# Patient Record
Sex: MALE | Race: WHITE | Employment: FULL TIME | URBAN - METROPOLITAN AREA
[De-identification: names, ages, dates, MRNs, and addresses within clinical notes are randomized per-mention and may not be internally consistent; named-entity substitution may affect disease eponyms.]

---

## 2017-01-25 ENCOUNTER — HOSPITAL ENCOUNTER (OUTPATIENT)
Age: 57
Discharge: HOME | End: 2017-01-25
Attending: EMERGENCY MEDICINE
Payer: COMMERCIAL

## 2017-01-25 VITALS
RESPIRATION RATE: 16 BRPM | WEIGHT: 315 LBS | BODY MASS INDEX: 40.43 KG/M2 | HEIGHT: 74 IN | HEART RATE: 78 BPM | OXYGEN SATURATION: 96 % | SYSTOLIC BLOOD PRESSURE: 133 MMHG | DIASTOLIC BLOOD PRESSURE: 91 MMHG | TEMPERATURE: 98 F

## 2017-01-25 DIAGNOSIS — J06.9 VIRAL URI: Primary | ICD-10-CM

## 2017-01-25 PROCEDURE — 99213 OFFICE O/P EST LOW 20 MIN: CPT

## 2017-01-25 PROCEDURE — 99212 OFFICE O/P EST SF 10 MIN: CPT

## 2017-01-25 RX ORDER — FLUTICASONE PROPIONATE 50 MCG
2 SPRAY, SUSPENSION (ML) NASAL DAILY
Qty: 16 G | Refills: 0 | Status: SHIPPED | OUTPATIENT
Start: 2017-01-25 | End: 2017-06-28

## 2017-01-25 RX ORDER — LORATADINE 10 MG/1
10 TABLET ORAL DAILY
Qty: 30 TABLET | Refills: 0 | Status: SHIPPED | OUTPATIENT
Start: 2017-01-25 | End: 2017-02-24

## 2017-01-25 RX ORDER — FLUTICASONE PROPIONATE 50 MCG
2 SPRAY, SUSPENSION (ML) NASAL DAILY
Qty: 16 G | Refills: 0 | Status: SHIPPED | OUTPATIENT
Start: 2017-01-25 | End: 2017-01-25

## 2017-01-25 RX ORDER — LORATADINE 10 MG/1
10 TABLET ORAL DAILY
Qty: 30 TABLET | Refills: 0 | Status: SHIPPED | OUTPATIENT
Start: 2017-01-25 | End: 2017-01-25

## 2017-01-25 NOTE — ED INITIAL ASSESSMENT (HPI)
Patient c/o nasal congestion and runny nose for 3 days. Patient has been taking benadryl without relief. Patient denies cough. States he thinks he needs abx.

## 2017-01-26 NOTE — ED PROVIDER NOTES
Patient presents with:  Nasal Congestion    HPI:     Maegan Montes is a 49year old male who presents with chief complaint of nasal congestion. Started 3 days ago. No fevers, ear pain, sore throat, cough, facial pain, dental pain.   Works for the post off

## 2017-06-28 PROCEDURE — 82746 ASSAY OF FOLIC ACID SERUM: CPT | Performed by: FAMILY MEDICINE

## 2017-06-28 PROCEDURE — 82607 VITAMIN B-12: CPT | Performed by: FAMILY MEDICINE

## 2017-08-08 PROCEDURE — 36415 COLL VENOUS BLD VENIPUNCTURE: CPT | Performed by: FAMILY MEDICINE

## 2017-08-08 PROCEDURE — 82746 ASSAY OF FOLIC ACID SERUM: CPT | Performed by: FAMILY MEDICINE

## 2017-08-08 PROCEDURE — 82607 VITAMIN B-12: CPT | Performed by: FAMILY MEDICINE

## 2017-08-14 RX ORDER — MELATONIN
1000 DAILY
COMMUNITY

## 2017-08-14 RX ORDER — FOLIC ACID 1 MG/1
1 TABLET ORAL DAILY
COMMUNITY

## 2017-08-24 ENCOUNTER — HOSPITAL ENCOUNTER (OUTPATIENT)
Facility: HOSPITAL | Age: 57
Setting detail: HOSPITAL OUTPATIENT SURGERY
Discharge: HOME | End: 2017-08-24
Attending: INTERNAL MEDICINE | Admitting: INTERNAL MEDICINE
Payer: COMMERCIAL

## 2017-08-24 ENCOUNTER — SURGERY (OUTPATIENT)
Age: 57
End: 2017-08-24

## 2017-08-24 VITALS
DIASTOLIC BLOOD PRESSURE: 65 MMHG | SYSTOLIC BLOOD PRESSURE: 117 MMHG | HEART RATE: 60 BPM | OXYGEN SATURATION: 95 % | HEIGHT: 74 IN | RESPIRATION RATE: 18 BRPM | WEIGHT: 315 LBS | BODY MASS INDEX: 40.43 KG/M2 | TEMPERATURE: 98 F

## 2017-08-24 DIAGNOSIS — Z86.010 HISTORY OF COLONIC POLYPS: ICD-10-CM

## 2017-08-24 DIAGNOSIS — Z86.010 PERSONAL HISTORY OF COLONIC POLYPS: ICD-10-CM

## 2017-08-24 PROCEDURE — 0DJD8ZZ INSPECTION OF LOWER INTESTINAL TRACT, VIA NATURAL OR ARTIFICIAL OPENING ENDOSCOPIC: ICD-10-PCS | Performed by: INTERNAL MEDICINE

## 2017-08-24 RX ORDER — MIDAZOLAM HYDROCHLORIDE 1 MG/ML
INJECTION INTRAMUSCULAR; INTRAVENOUS
Status: DISCONTINUED | OUTPATIENT
Start: 2017-08-24 | End: 2017-08-24

## 2017-08-24 RX ORDER — SODIUM CHLORIDE, SODIUM LACTATE, POTASSIUM CHLORIDE, CALCIUM CHLORIDE 600; 310; 30; 20 MG/100ML; MG/100ML; MG/100ML; MG/100ML
INJECTION, SOLUTION INTRAVENOUS CONTINUOUS
Status: DISCONTINUED | OUTPATIENT
Start: 2017-08-24 | End: 2017-08-24

## 2017-08-24 NOTE — DISCHARGE SUMMARY
Outpatient Surgery Brief Discharge Summary         Patient ID:  Vida Cummings  [id number]  56year old  7/25/1960    Discharge Diagnoses: diverticulosis    Procedures: Colonoscopy.     Discharged Condition: stable    Disposition: home    Patient Instructions

## 2017-08-24 NOTE — H&P
[facility] Department of  Gastroenterology  Health History   8/24/2017    Nicole Amato  male   Gastroenterologist   [place], [place]     [id number]  7/25/1960 Primary Care Physician  Dhara Bae DO       HPI :  Nicole Amato is here for [facility] Phosphate       Nausea only     Current MEDS:    No current facility-administered medications for this encounter. No current facility-administered medications on file prior to encounter.    Current Outpatient Prescriptions on File Prior to Encounter:  l understanding and agreed to proceed with procedure as scheduled.         Anish Salazar MD    6:42 AM

## 2017-08-24 NOTE — OPERATIVE REPORT
Patient Name:  Jonny Drew   Medical Record #: [id number]       Date of Procedure: 8/24/2017    Preoperative Diagnosis: Screen for colon cancer. Postoperative Diagnosis: Diverticulosis.     Procedure Performed: Colonoscopy to the cecum and terminal ileu screening.     Marcelino Odonnell M.D.

## 2017-08-24 NOTE — BRIEF OP NOTE
Pre-Operative Diagnosis: Personal history of colonic polyps [Z86.010]     Post-Operative Diagnosis: diverticulosis     Procedure Performed:   Procedure(s):  colonoscopy    Surgeon(s) and Role:     Cherelle Pitts MD - Primary    Assistant(s):        S

## 2018-01-07 ENCOUNTER — APPOINTMENT (OUTPATIENT)
Dept: GENERAL RADIOLOGY | Age: 58
End: 2018-01-07
Attending: NURSE PRACTITIONER
Payer: COMMERCIAL

## 2018-01-07 ENCOUNTER — HOSPITAL ENCOUNTER (OUTPATIENT)
Age: 58
Discharge: HOME | End: 2018-01-07
Payer: COMMERCIAL

## 2018-01-07 VITALS
BODY MASS INDEX: 45 KG/M2 | HEART RATE: 86 BPM | RESPIRATION RATE: 16 BRPM | OXYGEN SATURATION: 97 % | DIASTOLIC BLOOD PRESSURE: 69 MMHG | TEMPERATURE: 96 F | SYSTOLIC BLOOD PRESSURE: 153 MMHG | WEIGHT: 315 LBS

## 2018-01-07 DIAGNOSIS — J20.9 ACUTE BRONCHITIS, UNSPECIFIED ORGANISM: Primary | ICD-10-CM

## 2018-01-07 PROCEDURE — 99213 OFFICE O/P EST LOW 20 MIN: CPT

## 2018-01-07 PROCEDURE — 71046 X-RAY EXAM CHEST 2 VIEWS: CPT | Performed by: NURSE PRACTITIONER

## 2018-01-07 PROCEDURE — 99214 OFFICE O/P EST MOD 30 MIN: CPT

## 2018-01-07 RX ORDER — ALBUTEROL SULFATE 90 UG/1
1 AEROSOL, METERED RESPIRATORY (INHALATION) EVERY 4 HOURS PRN
Qty: 1 INHALER | Refills: 0 | Status: SHIPPED | OUTPATIENT
Start: 2018-01-07 | End: 2019-01-08

## 2018-01-07 RX ORDER — PREDNISONE 20 MG/1
40 TABLET ORAL DAILY
Qty: 10 TABLET | Refills: 0 | Status: SHIPPED | OUTPATIENT
Start: 2018-01-07 | End: 2018-01-12

## 2018-01-07 NOTE — ED INITIAL ASSESSMENT (HPI)
Pt sts cough/cold symptoms began 3-4 days days. Pain to chest/ribs when coughing. Cough is dry, chest feels tight. ? Fever.

## 2018-01-07 NOTE — ED PROVIDER NOTES
Patient Seen in: [address] [facility]    History   Patient presents with:  Cough/URI    Stated Complaint: Cough,Congestion    56-year-old male presents today with complaints of productive cough for last 2 or 3 days.   States has intermittent sh Smoker                                                    Packs/day: 0.00      Years: 0.00         Types: Cigars  Smokeless tobacco: Never Used                      Comment: 5 CIGARS PER YEAR  Alcohol use:  Yes              Comment: 1-2 TIMES PER MONTH congestion or shortness of breath. Follow with primary MD in 5-7 days symptoms not improved. Go to ER with any labored breathing uncontrolled wheezing or worsening of symptoms.           Disposition and Plan     Clinical Impression:  Acute bronchitis, uns

## 2018-03-28 PROBLEM — M72.2 PLANTAR FASCIITIS: Status: ACTIVE | Noted: 2018-03-28

## 2018-07-25 ENCOUNTER — HOSPITAL ENCOUNTER (OUTPATIENT)
Age: 58
Discharge: HOME | End: 2018-07-25
Attending: FAMILY MEDICINE
Payer: COMMERCIAL

## 2018-07-25 ENCOUNTER — APPOINTMENT (OUTPATIENT)
Dept: CT IMAGING | Age: 58
End: 2018-07-25
Attending: FAMILY MEDICINE
Payer: COMMERCIAL

## 2018-07-25 VITALS
SYSTOLIC BLOOD PRESSURE: 153 MMHG | OXYGEN SATURATION: 97 % | WEIGHT: 315 LBS | RESPIRATION RATE: 16 BRPM | DIASTOLIC BLOOD PRESSURE: 90 MMHG | TEMPERATURE: 98 F | BODY MASS INDEX: 45 KG/M2 | HEART RATE: 77 BPM

## 2018-07-25 DIAGNOSIS — K29.00 ACUTE GASTRITIS WITHOUT HEMORRHAGE, UNSPECIFIED GASTRITIS TYPE: Primary | ICD-10-CM

## 2018-07-25 LAB
#LYMPHOCYTE IC: 2.7 X10ˆ3/UL (ref 0.9–3.2)
#MXD IC: 0.6 X10ˆ3/UL (ref 0.1–1)
#NEUTROPHIL IC: 5.8 X10ˆ3/UL (ref 1.3–6.7)
ALBUMIN SERPL-MCNC: 4.1 G/DL (ref 3.5–4.8)
ALBUMIN/GLOB SERPL: 1.1 {RATIO} (ref 1–2)
ALP LIVER SERPL-CCNC: 70 U/L (ref 45–117)
ALT SERPL-CCNC: 37 U/L (ref 17–63)
ANION GAP SERPL CALC-SCNC: 7 MMOL/L (ref 0–18)
AST SERPL-CCNC: 24 U/L (ref 15–41)
BILIRUB SERPL-MCNC: 0.4 MG/DL (ref 0.1–2)
BUN BLD-MCNC: 18 MG/DL (ref 8–20)
BUN/CREAT SERPL: 18.4 (ref 10–20)
CALCIUM BLD-MCNC: 9.5 MG/DL (ref 8.3–10.3)
CHLORIDE SERPL-SCNC: 105 MMOL/L (ref 101–111)
CO2 SERPL-SCNC: 28 MMOL/L (ref 22–32)
CREAT BLD-MCNC: 0.98 MG/DL (ref 0.7–1.3)
CREAT SERPL-MCNC: 0.9 MG/DL (ref 0.7–1.3)
GLOBULIN PLAS-MCNC: 3.8 G/DL (ref 2.5–3.7)
GLUCOSE BLD-MCNC: 108 MG/DL (ref 70–99)
GLUCOSE BLD-MCNC: 112 MG/DL (ref 70–99)
HCT IC: 49.1 % (ref 37–54)
HGB IC: 16.6 G/DL (ref 13–17)
ISTAT BLOOD GAS TCO2: 26 MMOL/L (ref 22–32)
ISTAT BUN: 19 MG/DL (ref 8–20)
ISTAT CHLORIDE: 103 MMOL/L (ref 101–111)
ISTAT HEMATOCRIT: 51 % (ref 37–53)
ISTAT IONIZED CALCIUM: 1.17 MMOL/L
ISTAT POTASSIUM: 4.2 MMOL/L (ref 3.6–5.1)
ISTAT SODIUM: 140 MMOL/L (ref 136–144)
LIPASE: 118 U/L (ref 73–393)
LYMPHOCYTES NFR BLD AUTO: 29.4 %
M PROTEIN MFR SERPL ELPH: 7.9 G/DL (ref 6.1–8.3)
MCH IC: 29.9 PG (ref 27–33.2)
MCHC IC: 33.8 G/DL (ref 31–37)
MCV IC: 88.3 FL (ref 80–99)
MIXED CELL %: 6.3 %
NEUTROPHILS NFR BLD AUTO: 64.3 %
OSMOLALITY SERPL CALC.SUM OF ELEC: 292 MOSM/KG (ref 275–295)
PLT IC: 272 X10ˆ3/UL (ref 150–450)
POCT BILIRUBIN URINE: NEGATIVE
POCT BLOOD URINE: NEGATIVE
POCT GLUCOSE URINE: NEGATIVE MG/DL
POCT KETONE URINE: NEGATIVE MG/DL
POCT LEUKOCYTE ESTERASE URINE: NEGATIVE
POCT NITRITE URINE: NEGATIVE
POCT PH URINE: 7 (ref 5–8)
POCT PROTEIN URINE: NEGATIVE MG/DL
POCT SPECIFIC GRAVITY URINE: 1.02
POCT URINE CLARITY: CLEAR
POCT URINE COLOR: YELLOW
POCT UROBILINOGEN URINE: 0.2 MG/DL
POTASSIUM SERPL-SCNC: 4.3 MMOL/L (ref 3.6–5.1)
RBC IC: 5.56 X10ˆ6/UL (ref 4.3–5.7)
SODIUM SERPL-SCNC: 140 MMOL/L (ref 136–144)
WBC IC: 9.1 X10ˆ3/UL (ref 4–13)

## 2018-07-25 PROCEDURE — 83690 ASSAY OF LIPASE: CPT | Performed by: FAMILY MEDICINE

## 2018-07-25 PROCEDURE — 36415 COLL VENOUS BLD VENIPUNCTURE: CPT

## 2018-07-25 PROCEDURE — 99215 OFFICE O/P EST HI 40 MIN: CPT

## 2018-07-25 PROCEDURE — 80047 BASIC METABLC PNL IONIZED CA: CPT

## 2018-07-25 PROCEDURE — 80053 COMPREHEN METABOLIC PANEL: CPT | Performed by: FAMILY MEDICINE

## 2018-07-25 PROCEDURE — 81002 URINALYSIS NONAUTO W/O SCOPE: CPT | Performed by: FAMILY MEDICINE

## 2018-07-25 PROCEDURE — 85025 COMPLETE CBC W/AUTO DIFF WBC: CPT | Performed by: FAMILY MEDICINE

## 2018-07-25 PROCEDURE — 74177 CT ABD & PELVIS W/CONTRAST: CPT | Performed by: FAMILY MEDICINE

## 2018-07-25 PROCEDURE — 93010 ELECTROCARDIOGRAM REPORT: CPT

## 2018-07-25 PROCEDURE — 93005 ELECTROCARDIOGRAM TRACING: CPT

## 2018-07-25 NOTE — ED INITIAL ASSESSMENT (HPI)
Upper abd pain for one day, pt had severe acid refux last night, and he took his Nexium and tums with no reFlux. Pain comes and goes per patient, stool is green, when he has abd pain he is complaining of nausesa, denies now, no vomiting or diarrhea.

## 2018-07-26 NOTE — PROGRESS NOTES
Patient was seen in [facility] on 7/25/18. Diagnosed with Gastritis. Patient was not prescribed any medications.

## 2018-07-27 NOTE — ED PROVIDER NOTES
Patient Seen in: [address] [facility]    History   Patient presents with:  Abdominal Pain    Stated Complaint: STOMACH PAIN/ REFLUX PAIN     HPI    66year old male presents for upper abdominal pain and nausea.  Patient states he has history of Systems    Positive for stated complaint: STOMACH PAIN/ REFLUX PAIN   Other systems are as noted in HPI. Constitutional and vital signs reviewed. All other systems reviewed and negative except as noted above.     Physical Exam   ED Triage Vitals [07/2 0988  ------------------------------------------------------------  MDM     Patient presents for acute epigastric pain. EKG was normal. Cbc and BMP was normal. cmp and Lipase was ordered. CT abdomen/pelvis with contrast was unremarkable.  Advised to take ma

## 2018-07-30 LAB
ATRIAL RATE: 81 BPM
P AXIS: 28 DEGREES
P-R INTERVAL: 144 MS
Q-T INTERVAL: 362 MS
QRS DURATION: 100 MS
QTC CALCULATION (BEZET): 420 MS
R AXIS: 42 DEGREES
T AXIS: 18 DEGREES
VENTRICULAR RATE: 81 BPM

## 2019-08-28 PROBLEM — M17.11 PRIMARY OSTEOARTHRITIS OF RIGHT KNEE: Status: ACTIVE | Noted: 2019-08-28

## 2019-09-13 ENCOUNTER — HOSPITAL ENCOUNTER (OUTPATIENT)
Age: 59
Discharge: HOME | End: 2019-09-13
Attending: FAMILY MEDICINE
Payer: COMMERCIAL

## 2019-09-13 VITALS
DIASTOLIC BLOOD PRESSURE: 86 MMHG | HEART RATE: 89 BPM | OXYGEN SATURATION: 96 % | RESPIRATION RATE: 18 BRPM | TEMPERATURE: 99 F | SYSTOLIC BLOOD PRESSURE: 156 MMHG

## 2019-09-13 DIAGNOSIS — H60.02: Primary | ICD-10-CM

## 2019-09-13 PROCEDURE — 99213 OFFICE O/P EST LOW 20 MIN: CPT

## 2019-09-13 PROCEDURE — 99214 OFFICE O/P EST MOD 30 MIN: CPT

## 2019-09-13 RX ORDER — CEPHALEXIN 500 MG/1
500 CAPSULE ORAL 3 TIMES DAILY
Qty: 30 CAPSULE | Refills: 0 | Status: SHIPPED | OUTPATIENT
Start: 2019-09-13 | End: 2019-09-23

## 2019-09-13 NOTE — ED PROVIDER NOTES
Patient Seen in: [address] [facility]    History   Patient presents with:  Ear Problem Pain (neurosensory)    Stated Complaint: ear pain    HPI    62-year-old male presents to the immediate care today with chief complaints of pain in his left stated complaint: ear pain  Other systems are as noted in HPI. Constitutional and vital signs reviewed. All other systems reviewed and negative except as noted above.     Physical Exam     ED Triage Vitals [09/13/19 1716]   /86   Pulse 89   Resp [id number]  [phone]    In 1 week  For re-check, If symptoms worsen or do not improve        Medications Prescribed:  Current Discharge Medication List    START taking these medications    cephALEXin (KEFLEX) 500 MG Oral Cap  Take 1 capsule (500 mg total) b

## 2019-09-13 NOTE — ED INITIAL ASSESSMENT (HPI)
Left ear pain for one and half weeks, tried sudafed and over the counter ear drops for swimmer's ear, no fevers, but now has pain in left ear, 7/10

## 2020-02-06 ENCOUNTER — HOSPITAL ENCOUNTER (EMERGENCY)
Facility: HOSPITAL | Age: 60
Discharge: HOME | End: 2020-02-06
Attending: EMERGENCY MEDICINE
Payer: COMMERCIAL

## 2020-02-06 VITALS
RESPIRATION RATE: 18 BRPM | BODY MASS INDEX: 40.43 KG/M2 | DIASTOLIC BLOOD PRESSURE: 77 MMHG | HEIGHT: 74 IN | WEIGHT: 315 LBS | OXYGEN SATURATION: 95 % | SYSTOLIC BLOOD PRESSURE: 157 MMHG | TEMPERATURE: 98 F | HEART RATE: 86 BPM

## 2020-02-06 DIAGNOSIS — E86.0 DEHYDRATION: ICD-10-CM

## 2020-02-06 DIAGNOSIS — K52.9 GASTROENTERITIS: Primary | ICD-10-CM

## 2020-02-06 LAB
ALBUMIN SERPL-MCNC: 4.2 G/DL (ref 3.4–5)
ALBUMIN/GLOB SERPL: 1 {RATIO} (ref 1–2)
ALP LIVER SERPL-CCNC: 67 U/L (ref 45–117)
ALT SERPL-CCNC: 36 U/L (ref 16–61)
ANION GAP SERPL CALC-SCNC: 9 MMOL/L (ref 0–18)
AST SERPL-CCNC: 24 U/L (ref 15–37)
BASOPHILS # BLD AUTO: 0.05 X10(3) UL (ref 0–0.2)
BASOPHILS NFR BLD AUTO: 0.3 %
BILIRUB SERPL-MCNC: 0.9 MG/DL (ref 0.1–2)
BILIRUB UR QL STRIP.AUTO: NEGATIVE
BUN BLD-MCNC: 31 MG/DL (ref 7–18)
BUN/CREAT SERPL: 29.5 (ref 10–20)
CALCIUM BLD-MCNC: 9.3 MG/DL (ref 8.5–10.1)
CHLORIDE SERPL-SCNC: 103 MMOL/L (ref 98–112)
CO2 SERPL-SCNC: 22 MMOL/L (ref 21–32)
COLOR UR AUTO: YELLOW
CREAT BLD-MCNC: 1.05 MG/DL (ref 0.7–1.3)
DEPRECATED RDW RBC AUTO: 41.9 FL (ref 35.1–46.3)
EOSINOPHIL # BLD AUTO: 0 X10(3) UL (ref 0–0.7)
EOSINOPHIL NFR BLD AUTO: 0 %
ERYTHROCYTE [DISTWIDTH] IN BLOOD BY AUTOMATED COUNT: 13.4 % (ref 11–15)
GLOBULIN PLAS-MCNC: 4.2 G/DL (ref 2.8–4.4)
GLUCOSE BLD-MCNC: 143 MG/DL (ref 70–99)
GLUCOSE UR STRIP.AUTO-MCNC: NEGATIVE MG/DL
HCT VFR BLD AUTO: 54.5 % (ref 39–53)
HGB BLD-MCNC: 18.4 G/DL (ref 13–17.5)
IMM GRANULOCYTES # BLD AUTO: 0.05 X10(3) UL (ref 0–1)
IMM GRANULOCYTES NFR BLD: 0.3 %
KETONES UR STRIP.AUTO-MCNC: NEGATIVE MG/DL
LEUKOCYTE ESTERASE UR QL STRIP.AUTO: NEGATIVE
LYMPHOCYTES # BLD AUTO: 0.36 X10(3) UL (ref 1–4)
LYMPHOCYTES NFR BLD AUTO: 2.5 %
M PROTEIN MFR SERPL ELPH: 8.4 G/DL (ref 6.4–8.2)
MCH RBC QN AUTO: 29.7 PG (ref 26–34)
MCHC RBC AUTO-ENTMCNC: 33.8 G/DL (ref 31–37)
MCV RBC AUTO: 87.9 FL (ref 80–100)
MONOCYTES # BLD AUTO: 0.36 X10(3) UL (ref 0.1–1)
MONOCYTES NFR BLD AUTO: 2.5 %
NEUTROPHILS # BLD AUTO: 13.87 X10 (3) UL (ref 1.5–7.7)
NEUTROPHILS # BLD AUTO: 13.87 X10(3) UL (ref 1.5–7.7)
NEUTROPHILS NFR BLD AUTO: 94.4 %
NITRITE UR QL STRIP.AUTO: NEGATIVE
OSMOLALITY SERPL CALC.SUM OF ELEC: 287 MOSM/KG (ref 275–295)
PH UR STRIP.AUTO: 5 [PH] (ref 4.5–8)
PLATELET # BLD AUTO: 269 10(3)UL (ref 150–450)
POTASSIUM SERPL-SCNC: 4 MMOL/L (ref 3.5–5.1)
PROT UR STRIP.AUTO-MCNC: 30 MG/DL
RBC # BLD AUTO: 6.2 X10(6)UL (ref 4.3–5.7)
RBC UR QL AUTO: NEGATIVE
SODIUM SERPL-SCNC: 134 MMOL/L (ref 136–145)
SP GR UR STRIP.AUTO: 1.03 (ref 1–1.03)
UROBILINOGEN UR STRIP.AUTO-MCNC: <2 MG/DL
WBC # BLD AUTO: 14.7 X10(3) UL (ref 4–11)

## 2020-02-06 PROCEDURE — 85025 COMPLETE CBC W/AUTO DIFF WBC: CPT | Performed by: EMERGENCY MEDICINE

## 2020-02-06 PROCEDURE — 99284 EMERGENCY DEPT VISIT MOD MDM: CPT

## 2020-02-06 PROCEDURE — 96374 THER/PROPH/DIAG INJ IV PUSH: CPT

## 2020-02-06 PROCEDURE — 81001 URINALYSIS AUTO W/SCOPE: CPT | Performed by: EMERGENCY MEDICINE

## 2020-02-06 PROCEDURE — 96361 HYDRATE IV INFUSION ADD-ON: CPT

## 2020-02-06 PROCEDURE — 96375 TX/PRO/DX INJ NEW DRUG ADDON: CPT

## 2020-02-06 PROCEDURE — 80053 COMPREHEN METABOLIC PANEL: CPT | Performed by: EMERGENCY MEDICINE

## 2020-02-06 RX ORDER — KETOROLAC TROMETHAMINE 30 MG/ML
15 INJECTION, SOLUTION INTRAMUSCULAR; INTRAVENOUS ONCE
Status: COMPLETED | OUTPATIENT
Start: 2020-02-06 | End: 2020-02-06

## 2020-02-06 RX ORDER — ONDANSETRON 4 MG/1
4 TABLET, ORALLY DISINTEGRATING ORAL EVERY 4 HOURS PRN
Qty: 10 TABLET | Refills: 0 | Status: SHIPPED | OUTPATIENT
Start: 2020-02-06 | End: 2020-02-13

## 2020-02-06 RX ORDER — ONDANSETRON 2 MG/ML
4 INJECTION INTRAMUSCULAR; INTRAVENOUS ONCE
Status: COMPLETED | OUTPATIENT
Start: 2020-02-06 | End: 2020-02-06

## 2020-02-06 NOTE — ED INITIAL ASSESSMENT (HPI)
Pt presents to ed ambulatory with steady gait c/o n/v/d that started at 1700 yesterday evening. Pt also c/o bod aches. Pt is a&ox4, moves all extremities well, resps easy.

## 2020-02-06 NOTE — ED PROVIDER NOTES
Patient Seen in: [facility] Emergency Department      History   Patient presents with:  Nausea/Vomiting/Diarrhea    Stated Complaint: n/v/d    HPI    27-year-old male comes in the hospital complaint of having difficulty with nausea, vomiting and odette noted above.     Physical Exam     ED Triage Vitals [02/06/20 0629]   BP (!) 125/96   Pulse 104   Resp 20   Temp 98 °F (36.7 °C)   Temp src Temporal   SpO2 95 %   O2 Device None (Room air)       Current:/78   Pulse 91   Temp 98 °F (36.7 °C) (Temporal) -----------         ------                     CBC W/ DIFFERENTIAL[[id number]]          Abnormal            Final result                 Please view results for these tests on the individual orders.    [facility]

## 2020-02-25 ENCOUNTER — LAB ENCOUNTER (OUTPATIENT)
Dept: LAB | Age: 60
End: 2020-02-25
Attending: FAMILY MEDICINE
Payer: COMMERCIAL

## 2020-02-25 DIAGNOSIS — K52.9 GASTROENTERITIS: ICD-10-CM

## 2020-02-25 PROCEDURE — 87493 C DIFF AMPLIFIED PROBE: CPT

## 2020-02-25 PROCEDURE — 87045 FECES CULTURE AEROBIC BACT: CPT

## 2020-02-25 PROCEDURE — 87329 GIARDIA AG IA: CPT

## 2020-02-25 PROCEDURE — 87046 STOOL CULTR AEROBIC BACT EA: CPT

## 2020-02-25 PROCEDURE — 87209 SMEAR COMPLEX STAIN: CPT

## 2020-02-25 PROCEDURE — 87272 CRYPTOSPORIDIUM AG IF: CPT

## 2020-02-25 PROCEDURE — 87177 OVA AND PARASITES SMEARS: CPT

## 2020-02-26 LAB
C DIFF TOX B STL QL: NEGATIVE
CRYPTOSP AG STL QL IA: NEGATIVE
G LAMBLIA AG STL QL IA: NEGATIVE

## 2020-02-29 LAB — OVA AND PARASITE, FECAL INTERPRETATION: POSITIVE

## 2020-03-03 ENCOUNTER — LAB ENCOUNTER (OUTPATIENT)
Dept: LAB | Age: 60
End: 2020-03-03
Attending: FAMILY MEDICINE
Payer: COMMERCIAL

## 2020-03-03 DIAGNOSIS — Z13.0 SCREENING FOR DISORDER OF BLOOD AND BLOOD-FORMING ORGANS: ICD-10-CM

## 2020-03-03 DIAGNOSIS — Z13.29 ENCOUNTER FOR SCREENING FOR ENDOCRINE DISORDER: ICD-10-CM

## 2020-03-03 DIAGNOSIS — Z13.220 ENCOUNTER FOR SCREENING FOR LIPID DISORDER: ICD-10-CM

## 2020-03-03 DIAGNOSIS — R73.9 HYPERGLYCEMIA: ICD-10-CM

## 2020-03-03 DIAGNOSIS — Z12.5 ENCOUNTER FOR SCREENING FOR MALIGNANT NEOPLASM OF PROSTATE: ICD-10-CM

## 2020-03-03 DIAGNOSIS — Z13.228 ENCOUNTER FOR SCREENING FOR METABOLIC DISORDER: ICD-10-CM

## 2020-03-03 LAB
ALT SERPL-CCNC: 34 U/L (ref 16–61)
CHOLEST SMN-MCNC: 172 MG/DL (ref ?–200)
DEPRECATED RDW RBC AUTO: 43.9 FL (ref 35.1–46.3)
ERYTHROCYTE [DISTWIDTH] IN BLOOD BY AUTOMATED COUNT: 13.1 % (ref 11–15)
EST. AVERAGE GLUCOSE BLD GHB EST-MCNC: 128 MG/DL (ref 68–126)
HBA1C MFR BLD HPLC: 6.1 % (ref ?–5.7)
HCT VFR BLD AUTO: 50.9 % (ref 39–53)
HDLC SERPL-MCNC: 41 MG/DL (ref 40–59)
HGB BLD-MCNC: 16.6 G/DL (ref 13–17.5)
LDLC SERPL CALC-MCNC: 108 MG/DL (ref ?–100)
MCH RBC QN AUTO: 29.7 PG (ref 26–34)
MCHC RBC AUTO-ENTMCNC: 32.6 G/DL (ref 31–37)
MCV RBC AUTO: 91.2 FL (ref 80–100)
NONHDLC SERPL-MCNC: 131 MG/DL (ref ?–130)
PATIENT FASTING Y/N/NP: YES
PLATELET # BLD AUTO: 270 10(3)UL (ref 150–450)
PSA SERPL-MCNC: 0.84 NG/ML (ref ?–4)
RBC # BLD AUTO: 5.58 X10(6)UL (ref 4.3–5.7)
TRIGL SERPL-MCNC: 113 MG/DL (ref 30–149)
TSI SER-ACNC: 3.52 MIU/ML (ref 0.36–3.74)
VLDLC SERPL CALC-MCNC: 23 MG/DL (ref 0–30)
WBC # BLD AUTO: 8.2 X10(3) UL (ref 4–11)

## 2020-03-03 PROCEDURE — 84460 ALANINE AMINO (ALT) (SGPT): CPT

## 2020-03-03 PROCEDURE — 83036 HEMOGLOBIN GLYCOSYLATED A1C: CPT

## 2020-03-03 PROCEDURE — 85027 COMPLETE CBC AUTOMATED: CPT

## 2020-03-03 PROCEDURE — 84153 ASSAY OF PSA TOTAL: CPT

## 2020-03-03 PROCEDURE — 84443 ASSAY THYROID STIM HORMONE: CPT

## 2020-03-03 PROCEDURE — 80061 LIPID PANEL: CPT

## 2020-03-03 NOTE — PROGRESS NOTES
Sugars in the prediabetes range, recommend low sugar and low carb diet   Cholesterol very mildly elevated. REcommend low fat diet.     Other labs normal

## 2020-03-03 NOTE — PROGRESS NOTES
+blastocysitis found since symptomatic will treat. Start flagyl 500mg TID x 10 days.    Avoid all alcohol with this medication

## 2020-04-29 PROBLEM — M17.0 PRIMARY OSTEOARTHRITIS OF BOTH KNEES: Status: ACTIVE | Noted: 2020-04-29

## 2020-06-10 ENCOUNTER — APPOINTMENT (OUTPATIENT)
Dept: CT IMAGING | Age: 60
End: 2020-06-10
Attending: FAMILY MEDICINE
Payer: COMMERCIAL

## 2020-06-10 ENCOUNTER — HOSPITAL ENCOUNTER (OUTPATIENT)
Age: 60
Discharge: HOME | End: 2020-06-10
Attending: FAMILY MEDICINE
Payer: COMMERCIAL

## 2020-06-10 VITALS
RESPIRATION RATE: 16 BRPM | HEART RATE: 88 BPM | SYSTOLIC BLOOD PRESSURE: 137 MMHG | OXYGEN SATURATION: 96 % | TEMPERATURE: 98 F | DIASTOLIC BLOOD PRESSURE: 84 MMHG

## 2020-06-10 DIAGNOSIS — M54.2 ANTERIOR NECK PAIN: Primary | ICD-10-CM

## 2020-06-10 PROCEDURE — 87430 STREP A AG IA: CPT | Performed by: FAMILY MEDICINE

## 2020-06-10 PROCEDURE — 80047 BASIC METABLC PNL IONIZED CA: CPT

## 2020-06-10 PROCEDURE — 70491 CT SOFT TISSUE NECK W/DYE: CPT | Performed by: FAMILY MEDICINE

## 2020-06-10 PROCEDURE — 84439 ASSAY OF FREE THYROXINE: CPT | Performed by: FAMILY MEDICINE

## 2020-06-10 PROCEDURE — 86376 MICROSOMAL ANTIBODY EACH: CPT | Performed by: FAMILY MEDICINE

## 2020-06-10 PROCEDURE — 87081 CULTURE SCREEN ONLY: CPT | Performed by: FAMILY MEDICINE

## 2020-06-10 PROCEDURE — 99214 OFFICE O/P EST MOD 30 MIN: CPT

## 2020-06-10 PROCEDURE — 36415 COLL VENOUS BLD VENIPUNCTURE: CPT

## 2020-06-10 PROCEDURE — 85025 COMPLETE CBC W/AUTO DIFF WBC: CPT | Performed by: FAMILY MEDICINE

## 2020-06-10 PROCEDURE — 84443 ASSAY THYROID STIM HORMONE: CPT | Performed by: FAMILY MEDICINE

## 2020-06-10 NOTE — ED PROVIDER NOTES
Patient Seen in: [address] [facility]      History   Patient presents with:  Sore Throat    Stated Complaint: sore throat and neck pain    HPI    *62-year-old male presents to the immediate care today with chief complaints of pain across the a patient/caregiver and is not pertinent to presenting problem.     Social History    Tobacco Use      Smoking status: Former Smoker        Types: Cigars      Smokeless tobacco: Never Used      Tobacco comment: 5 CIGARS PER YEAR    Alcohol use: Yes      Frequ C-spine tenderness. No paracervical muscle spasm.         ED Course     Labs Reviewed   POCT ISTAT CHEM8 CARTRIDGE - Abnormal; Notable for the following components:       Result Value    ISTAT BUN 23 (*)     ISTAT Ionized Calcium 1.11 (*)     ISTAT Glucose

## 2020-06-10 NOTE — ED INITIAL ASSESSMENT (HPI)
Pt sts 2 nights ago began with pain to throat/neck. Sts no pain when swallowing or talking but pain to neck when taking in a deep breath. Denies fever, cough, nasal congestion, chills, loss of smell/taste, nausea.

## 2020-06-11 NOTE — ED NOTES
Pt informed of TPO, TSH/Free t4 result. Advised to f/u with PMD for further guidance. Verb understanding. Awaiting Covid19 and strep culture results. Sts neck pain has resolved.    Result Notes for THYROID PEROXIDASE (TPO) AB     Notes recorded by Maliha Haas

## 2020-12-23 ENCOUNTER — LAB ENCOUNTER (OUTPATIENT)
Dept: LAB | Age: 60
End: 2020-12-23
Attending: FAMILY MEDICINE
Payer: COMMERCIAL

## 2020-12-23 DIAGNOSIS — R76.8 THYROID ANTIBODY POSITIVE: ICD-10-CM

## 2020-12-23 DIAGNOSIS — Z71.89 EDUCATED ABOUT COVID-19 VIRUS INFECTION: Primary | ICD-10-CM

## 2020-12-23 PROCEDURE — 84439 ASSAY OF FREE THYROXINE: CPT

## 2020-12-23 PROCEDURE — 84481 FREE ASSAY (FT-3): CPT

## 2020-12-23 PROCEDURE — 86769 SARS-COV-2 COVID-19 ANTIBODY: CPT

## 2020-12-23 PROCEDURE — 36415 COLL VENOUS BLD VENIPUNCTURE: CPT

## 2020-12-23 PROCEDURE — 84443 ASSAY THYROID STIM HORMONE: CPT

## 2021-03-03 PROBLEM — G56.02 CARPAL TUNNEL SYNDROME ON LEFT: Status: ACTIVE | Noted: 2021-03-03

## 2022-01-02 ENCOUNTER — APPOINTMENT (OUTPATIENT)
Dept: GENERAL RADIOLOGY | Age: 62
End: 2022-01-02
Attending: NURSE PRACTITIONER
Payer: COMMERCIAL

## 2022-01-02 ENCOUNTER — HOSPITAL ENCOUNTER (OUTPATIENT)
Age: 62
Discharge: HOME | End: 2022-01-02
Payer: COMMERCIAL

## 2022-01-02 VITALS
TEMPERATURE: 98 F | OXYGEN SATURATION: 98 % | SYSTOLIC BLOOD PRESSURE: 157 MMHG | BODY MASS INDEX: 40.43 KG/M2 | DIASTOLIC BLOOD PRESSURE: 83 MMHG | RESPIRATION RATE: 20 BRPM | WEIGHT: 315 LBS | HEIGHT: 74 IN | HEART RATE: 90 BPM

## 2022-01-02 DIAGNOSIS — S49.91XA INJURY OF RIGHT SHOULDER, INITIAL ENCOUNTER: Primary | ICD-10-CM

## 2022-01-02 PROCEDURE — 73030 X-RAY EXAM OF SHOULDER: CPT | Performed by: NURSE PRACTITIONER

## 2022-01-02 PROCEDURE — 99213 OFFICE O/P EST LOW 20 MIN: CPT | Performed by: NURSE PRACTITIONER

## 2022-01-02 NOTE — ED PROVIDER NOTES
Patient Seen in: Immediate [facility]      History   Patient presents with:  Arm or Hand Injury    Stated Complaint: R. Shoulder Injury    Subjective:   29-year-old male presents today with history of fall this morning while snowblowing.   States the snow palpation over the deltoid of the right shoulder. No gross deformities or swelling. Patient is able to raise his arm above his head but with increased pain. No increased pain with internal or external rotation. Normal strength to the shoulder.   Equal g

## 2022-01-02 NOTE — ED INITIAL ASSESSMENT (HPI)
Pt was snow blowing and hit an extension cord and it threw the snowblower and him. He fell landing on the r shoulder.  Co pain

## 2024-03-14 ENCOUNTER — APPOINTMENT (OUTPATIENT)
Dept: ULTRASOUND IMAGING | Age: 64
End: 2024-03-14
Payer: COMMERCIAL

## 2024-03-14 ENCOUNTER — HOSPITAL ENCOUNTER (EMERGENCY)
Age: 64
Discharge: HOME | End: 2024-03-14
Attending: EMERGENCY MEDICINE
Payer: COMMERCIAL

## 2024-03-14 VITALS
TEMPERATURE: 98 F | DIASTOLIC BLOOD PRESSURE: 80 MMHG | BODY MASS INDEX: 38.5 KG/M2 | HEART RATE: 82 BPM | SYSTOLIC BLOOD PRESSURE: 155 MMHG | HEIGHT: 74 IN | OXYGEN SATURATION: 94 % | RESPIRATION RATE: 18 BRPM | WEIGHT: 300 LBS

## 2024-03-14 DIAGNOSIS — M79.661 RIGHT CALF PAIN: Primary | ICD-10-CM

## 2024-03-14 PROCEDURE — 99284 EMERGENCY DEPT VISIT MOD MDM: CPT

## 2024-03-14 PROCEDURE — 93971 EXTREMITY STUDY: CPT

## 2024-03-14 NOTE — ED INITIAL ASSESSMENT (HPI)
Pt had right knee replacement about 2 weeks ago.  Pt having pain in posterior calf.  MD sent in to R/O blood clot.

## 2024-03-14 NOTE — DISCHARGE INSTRUCTIONS
Continue postoperative care.  Do not place any sort of tourniquet above the knee     follow-up with your primary care doctor and orthopedist

## 2024-03-14 NOTE — ED PROVIDER NOTES
Patient Seen in: [facility] [place]      History     Chief Complaint   Patient presents with    Pain     Stated Complaint: knee pain and calf pain.    Subjective:   HPI    Patient had knee replacement about 2 weeks ago.  Patient notes that his calf was very sore and swollen.  Just to touch, it was very uncomfortable.  This seems to be getting better but he was concerned about possibly DVT and referred to the emergency department for evaluation.  Patient notes that he was applying his compression stockings but also using a wrap above the knee joint around the thigh as well.  He has been trying to keep it elevated.  No discharge from the wound.  No fever.  No red streaks up the leg.  There is some bruising scattered over the lower extremity as well    Objective:   Past Medical History:   Diagnosis Date    A-fib (HCC)     Adverse effect of anesthetic     trigger to migraine    ED (erectile dysfunction) 2/16/2011    High blood pressure     High cholesterol     HYPERLIPIDEMIA     HYPERTENSION     Migraines     OTHER DISEASES quit 1994    NARCOTIC DEPENDENCE    Visual impairment     glasses              Past Surgical History:   Procedure Laterality Date    ANGIOPLASTY (CORONARY)  5/14    no stent needed    COLONOSCOPY  11/16/12  [facility]    Screening: two polyps both >1cm in right/transverse colon (hyperplastic), sigmoid diverticulosis, int hemorrhoids; next colonoscopy in 5 years    COLONOSCOPY N/A 8/24/2017    Procedure: COLONOSCOPY;  Surgeon: Lyle Alves MD;  Location:  ENDOSCOPY    OTHER SURGICAL HISTORY      BILATERAL KNEE MENISCUS SURGERY    OTHER SURGICAL HISTORY  4/2009    left knee surgery                Social History     Socioeconomic History    Marital status:    Occupational History    Occupation:    Tobacco Use    Smoking status: Former     Types: Cigars    Smokeless tobacco: Never    Tobacco comments:     5 CIGARS PER YEAR   Vaping Use    Vaping Use:  Never used   Substance and Sexual Activity    Alcohol use: Yes     Comment: 1-2 TIMES PER MONTH     Drug use: No    Sexual activity: Yes     Partners: Female     Comment: no ed   Other Topics Concern    Blood Transfusions No    Caffeine Concern No     Comment: 1-2 cups per day    Sleep Concern Yes     Comment: intermittent sleep issues    Stress Concern No    Weight Concern Yes    Exercise No     Comment: recommend 5 x per week, aerobic activity    Self-Exams Yes              Review of Systems    Positive for stated complaint: knee pain and calf pain.  Other systems are as noted in HPI.  Constitutional and vital signs reviewed.      All other systems reviewed and negative except as noted above.    Physical Exam     ED Triage Vitals [03/14/24 1548]   /69   Pulse 82   Resp 18   Temp 97.8 °F (36.6 °C)   Temp src Temporal   SpO2 96 %   O2 Device None (Room air)       Current:/80   Pulse 82   Temp 97.8 °F (36.6 °C) (Temporal)   Resp 18   Ht 188 cm (6' 2\")   Wt 136.1 kg   SpO2 94%   BMI 38.52 kg/m²         Physical Exam  On examination, a healing knee incision is noted.  This appears benign erythematous with no discharge.  The lower leg is swollen and there is some ecchymosis scattered.  There is some mild tenderness of the calf musculature but compartments are soft no evidence for compartment syndrome.  The dorsalis pedis pulses strong.  Foot is nonswollen without pedal edema.  Sensation intact to light touch.  Strength in foot is excellent       ED Course   Labs Reviewed - No data to display                   MDM      With calf pain and recent surgery, there is concern for DVT.  Strain or discomfort related to the surgery and dependent edema also include in the differential.  Ultrasound leg indicated.  No arterial insufficiency.  No evidence of any incision cellulitis    Ultrasound leg  I personally reviewed the actual radiographs themselves and my individual interpretation shows no DV  radiologist's  formal interpretation which I have reviewed    CONCLUSION:  No DVT right lower extremity.        I reviewed the results of the ultrasound with the patient.  At this point, I believe patient may be safely discharged home.  Patient describes placing a bandage above the knee.  I would discourage this.  He may wear his KIMBERLY hose and I encouraged elevation as often as possible to level of his hip or 1 pillow above.  I recommend close follow-up with his primary care doctor and/or orthopedist.                               Medical Decision Making      Disposition and Plan     Clinical Impression:  1. Right calf pain         Disposition:  Discharge  3/14/2024  5:13 pm    Follow-up:  Edie Amato DO  [address] [place] [id number] [phone]    Follow up            Medications Prescribed:  Current Discharge Medication List

## (undated) DEVICE — Device: Brand: DEFENDO AIR/WATER/SUCTION AND BIOPSY VALVE

## (undated) DEVICE — FILTERLINE NASAL ADULT O2/CO2

## (undated) DEVICE — ENDOSCOPY PACK - LOWER: Brand: MEDLINE INDUSTRIES, INC.

## (undated) DEVICE — 1200CC GUARDIAN II: Brand: GUARDIAN

## (undated) DEVICE — 3M™ RED DOT™ MONITORING ELECTRODE WITH FOAM TAPE AND STICKY GEL, 50/BAG, 20/CASE, 72/PLT 2570: Brand: RED DOT™

## (undated) NOTE — LETTER
February 28, 2020          J[address] [place] [id number]          Dear Nati Montoya:    Negative stool cultures (no infection.)      The following are the results of your recent tests ordered by [facility].   Please review the list of test

## (undated) NOTE — LETTER
Date: 8/24/2017          Patient Name: Toña Sandoval        To Whom it may concern: This letter has been written at the patient's request. The above patient was seen at the [facility] for treatment of a medical condition.     This patient s

## (undated) NOTE — LETTER
Date & Time: 1/2/2022, 9:27 AM  Patient: [place] Signs  Encounter Provider(s):    SANTY Parish       To Whom It May Concern:    Darrian Kumar was seen and treated in our department on 1/2/2022.  He may return to work 1/6/2022 if symptoms have impr

## (undated) NOTE — ED AVS SNAPSHOT
Jmaie Walton   MRN: [id number]    Department:  [facility] Emergency Department   Date of Visit:  2/6/2020           Disclosure     Insurance plans vary and the physician(s) referred by the ER may not be covered by your plan.  Please contact your i tell this physician (or your personal doctor if your instructions are to return to your personal doctor) about any new or lasting problems. The primary care or specialist physician will see patients referred from the [facility] Emergency Department.  Antonio Ovalles

## (undated) NOTE — ED AVS SNAPSHOT
[facility] in [address] [address]    Phone:  [phone]    Fax:  Dania Ott   MRN: [id number]    Department[facility]   Date of Visit:  1/25/2017           [place] [phone] [address] [place] [address] 1   [phone]       To Check ER Wait Times:  TEXT 'Allen Kiran' to [id number]      Click [email or web address]      Or call [phone]    If you have any problems with your follow-up, please phone number before you leave. After you leave, you should follow the attached instructions. I have read and understand the instructions given to me by my caregivers.         [facility]        Pharmacy Address Phone Number   [phone] If you have questions, you can call [phone] to talk to our [facility] Staff. Remember, [employer] is NOT to be used for urgent needs. For medical emergencies, dial 911. Visit [email or web address] to learn more.

## (undated) NOTE — LETTER
To Whom It May Concern:    Anitra Lane has been under our care regarding ongoing medical issues. Because of this, he has been required to restrict her physical activities.     He may resume his usual activities, including work, on Friday, August 25, 201